# Patient Record
Sex: FEMALE | Race: WHITE | URBAN - METROPOLITAN AREA
[De-identification: names, ages, dates, MRNs, and addresses within clinical notes are randomized per-mention and may not be internally consistent; named-entity substitution may affect disease eponyms.]

---

## 2017-11-14 ENCOUNTER — APPOINTMENT (RX ONLY)
Dept: URBAN - METROPOLITAN AREA TELEMEDICINE 2 | Facility: TELEMEDICINE | Age: 52
Setting detail: DERMATOLOGY
End: 2017-11-14

## 2017-11-14 DIAGNOSIS — Z41.9 ENCOUNTER FOR PROCEDURE FOR PURPOSES OTHER THAN REMEDYING HEALTH STATE, UNSPECIFIED: ICD-10-CM

## 2017-11-14 PROCEDURE — ? MEDICAL CONSULTATION: BROWN SPOTS

## 2017-11-14 ASSESSMENT — LOCATION DETAILED DESCRIPTION DERM: LOCATION DETAILED: LEFT CENTRAL MALAR CHEEK

## 2017-11-14 ASSESSMENT — LOCATION ZONE DERM: LOCATION ZONE: FACE

## 2017-11-14 ASSESSMENT — LOCATION SIMPLE DESCRIPTION DERM: LOCATION SIMPLE: LEFT CHEEK

## 2017-12-15 ENCOUNTER — APPOINTMENT (RX ONLY)
Dept: URBAN - METROPOLITAN AREA TELEMEDICINE 2 | Facility: TELEMEDICINE | Age: 52
Setting detail: DERMATOLOGY
End: 2017-12-15

## 2017-12-15 ENCOUNTER — RX ONLY (OUTPATIENT)
Age: 52
Setting detail: RX ONLY
End: 2017-12-15

## 2017-12-15 DIAGNOSIS — Z41.9 ENCOUNTER FOR PROCEDURE FOR PURPOSES OTHER THAN REMEDYING HEALTH STATE, UNSPECIFIED: ICD-10-CM

## 2017-12-15 PROCEDURE — ? SCITON BBL

## 2017-12-15 RX ORDER — VALACYCLOVIR HYDROCHLORIDE 500 MG/1
1 TABLET, FILM COATED ORAL TWICE A DAY
Qty: 30 | Refills: 1 | Status: ERX | COMMUNITY
Start: 2017-12-15

## 2017-12-15 ASSESSMENT — LOCATION DETAILED DESCRIPTION DERM: LOCATION DETAILED: LEFT MEDIAL MALAR CHEEK

## 2017-12-15 ASSESSMENT — LOCATION ZONE DERM: LOCATION ZONE: FACE

## 2017-12-15 ASSESSMENT — LOCATION SIMPLE DESCRIPTION DERM: LOCATION SIMPLE: LEFT CHEEK

## 2017-12-15 NOTE — HPI: FACE (AGING FACE)
How Severe Is It?: moderate
Is This A New Presentation, Or A Follow-Up?: Aging Face
Additional History: No autoimmune, hepatitis, diabetes, HIV, pregnant. Has HSV.

## 2017-12-15 NOTE — PROCEDURE: SCITON BBL
Spot Size: Finesse Adapter Size: 15 x 15 mm square
Cooling ?: Yes
Repetition Rate (Hz): 10
Cooling (In C): 20
Post-Care Instructions: I reviewed with the patient in detail post-care instructions. Patient should stay away from the sun and wear sun protection until treated areas are fully healed.\\n*
Spot Size: Finesse Adapter Size: 15 x 45 mm (No Finesse Adapter)
Treated Area: medium area
Anesthesia Volume In Cc: 0
Spot Size: Finesse Adapter Size: 11 mm round
Pulse Duration Units: milliseconds
Fluence (J/Cm2): 8
Treatment Number: 1
Treated Area: small area
Cooling (In C): 18
Post Procedure Text: The patient tolerated the procedure well. Post care was reviewd with the patient.\\n* purchased 1 full bbl, 2 bbl brown, 2 TCA spot.
Detail Level: Zone
Fluence (J/Cm2): 13
Consent: Written consent obtained, risks reviewed including but not limited to crusting, scabbing, blistering, scarring, darker or lighter pigmentary change, bruising, and/or incomplete response.
Preprocedure Text: The treatment areas were thoroughly cleaned. Any exposed at risk hair that was not to be treated was covered in white paper tape. Clear ultrasound gel was applied to area being treated.
Pulse Duration: 15
Pulse Duration: 25

## 2018-01-12 ENCOUNTER — APPOINTMENT (RX ONLY)
Dept: URBAN - METROPOLITAN AREA TELEMEDICINE 2 | Facility: TELEMEDICINE | Age: 53
Setting detail: DERMATOLOGY
End: 2018-01-12

## 2018-01-12 DIAGNOSIS — Z41.9 ENCOUNTER FOR PROCEDURE FOR PURPOSES OTHER THAN REMEDYING HEALTH STATE, UNSPECIFIED: ICD-10-CM

## 2018-01-12 PROCEDURE — ? SCITON BBL

## 2018-01-12 PROCEDURE — ? CHEMICAL PEEL

## 2018-01-12 ASSESSMENT — LOCATION DETAILED DESCRIPTION DERM
LOCATION DETAILED: LEFT INFERIOR CENTRAL MALAR CHEEK
LOCATION DETAILED: LEFT CENTRAL MALAR CHEEK

## 2018-01-12 ASSESSMENT — LOCATION SIMPLE DESCRIPTION DERM: LOCATION SIMPLE: LEFT CHEEK

## 2018-01-12 ASSESSMENT — LOCATION ZONE DERM: LOCATION ZONE: FACE

## 2018-01-12 NOTE — PROCEDURE: SCITON BBL
Consent: Written consent obtained, risks reviewed including but not limited to crusting, scabbing, blistering, scarring, darker or lighter pigmentary change, bruising, and/or incomplete response.
Pulse Duration Units: milliseconds
Repetition Rate (Hz): 13
Passes: 1
Spot Size: Finesse Adapter Size: 15 x 45 mm (No Finesse Adapter)
Treatment Number: 2
Post Procedure Text: The patient tolerated the procedure well.
Spot Size: Finesse Adapter Size: 15 x 15 mm square
Cooling (In C): 21
Pulse Duration: 15
Detail Level: Zone
Anesthesia Volume In Cc: 0
Fluence (J/Cm2): 25
Fluence (J/Cm2): 10
Pulse Duration: 20
Post-Care Instructions: I reviewed with the patient in detail post-care instructions. Patient should stay away from the sun and wear sun protection until treated areas are fully healed
Cooling ?: Yes
Treated Area: medium area
Preprocedure Text: The treatment areas were thoroughly cleaned. Any exposed at risk hair that was not to be treated was covered in white paper tape. Clear ultrasound gel was applied to the treatment area. The area was treated with no immediate stacking of pulses.

## 2018-01-12 NOTE — PROCEDURE: CHEMICAL PEEL
Comments: Spot treat TCA left cheek
Chemical Peel: TCA 15%
Post-Care Instructions: I reviewed with the patient in detail post-care instructions. Patient should avoid sun exposure and wear sun protection.
Prep: Faced washed and prepped in a typical fashion.
Consent: Prior to the procedure, written consent was obtained and risks were reviewed, including but not limited to: redness, peeling, blistering, pigmentary change, scarring, infection, and pain.
Detail Level: Zone
Post Peel Care: Follow post peel care as instructed
Number Of Layers: 1

## 2018-02-23 ENCOUNTER — APPOINTMENT (RX ONLY)
Dept: URBAN - METROPOLITAN AREA TELEMEDICINE 2 | Facility: TELEMEDICINE | Age: 53
Setting detail: DERMATOLOGY
End: 2018-02-23

## 2018-02-23 DIAGNOSIS — Z41.9 ENCOUNTER FOR PROCEDURE FOR PURPOSES OTHER THAN REMEDYING HEALTH STATE, UNSPECIFIED: ICD-10-CM

## 2018-02-23 PROCEDURE — ? CHEMICAL PEEL

## 2018-02-23 PROCEDURE — ? OTHER (COSMETIC)

## 2018-02-23 PROCEDURE — ? SCITON BBL

## 2018-02-23 ASSESSMENT — LOCATION DETAILED DESCRIPTION DERM
LOCATION DETAILED: LEFT INFERIOR CENTRAL MALAR CHEEK
LOCATION DETAILED: LEFT CENTRAL MALAR CHEEK
LOCATION DETAILED: RIGHT INFERIOR MEDIAL MALAR CHEEK

## 2018-02-23 ASSESSMENT — LOCATION SIMPLE DESCRIPTION DERM
LOCATION SIMPLE: RIGHT CHEEK
LOCATION SIMPLE: LEFT CHEEK

## 2018-02-23 ASSESSMENT — LOCATION ZONE DERM: LOCATION ZONE: FACE

## 2018-02-23 NOTE — PROCEDURE: OTHER (COSMETIC)
Other (Free Text): Suggested patient schedule in 1 month for another treatment.\\nPatient will owe $290
Detail Level: Zone

## 2018-02-23 NOTE — PROCEDURE: CHEMICAL PEEL
Prep: Faced washed and prepped in a typical fashion.
Post-Care Instructions: I reviewed with the patient in detail post-care instructions. Patient should avoid sun exposure and wear sun protection.
Post Peel Care: Follow post peel care as instructed
Number Of Layers: 3
Chemical Peel: TCA 15%
Detail Level: Zone
Consent: Prior to the procedure, written consent was obtained and risks were reviewed, including but not limited to: redness, peeling, blistering, pigmentary change, scarring, infection, and pain.
Comments: Spot treat SK on L side of face

## 2018-02-23 NOTE — PROCEDURE: SCITON BBL
Repetition Rate (Hz): 13
Repetition Rate (Hz): 12
Pulse Duration: 20
Cooling ?: Yes
Repetition Rate (Hz): 10
Spot Size: Finesse Adapter Size: 15 x 15 mm square
Cooling (In C): 15
Pulse Duration Units: milliseconds
Passes: 1
Fluence (J/Cm2): 25
Fluence (J/Cm2): 14
Spot Size: Finesse Adapter Size: 15 x 45 mm (No Finesse Adapter)
Consent: Written consent obtained, risks reviewed including but not limited to crusting, scabbing, blistering, scarring, darker or lighter pigmentary change, bruising, and/or incomplete response.
Preprocedure Text: The treatment areas were thoroughly cleaned. Any exposed at risk hair that was not to be treated was covered in white paper tape. Clear ultrasound gel was applied to the treatment area. The area was treated with no immediate stacking of pulses.
Treated Area: medium area
Treatment Number: 3
Anesthesia Volume In Cc: 0
Detail Level: Zone
Post-Care Instructions: I reviewed with the patient in detail post-care instructions. Patient should stay away from the sun and wear sun protection until treated areas are fully healed
Cooling (In C): 22
Post Procedure Text: The patient tolerated the procedure well.

## 2018-09-01 NOTE — HPI: FACE (AGING FACE)
How Severe Is It?: moderate
Is This A New Presentation, Or A Follow-Up?: Aging Face
Additional History: No autoimmune, hepatitis, diabetes, pregnant, HIV. Has HSV.
Ambulatory

## 2019-02-04 ENCOUNTER — APPOINTMENT (RX ONLY)
Dept: URBAN - METROPOLITAN AREA TELEMEDICINE 2 | Facility: TELEMEDICINE | Age: 54
Setting detail: DERMATOLOGY
End: 2019-02-04

## 2019-02-04 DIAGNOSIS — Z41.9 ENCOUNTER FOR PROCEDURE FOR PURPOSES OTHER THAN REMEDYING HEALTH STATE, UNSPECIFIED: ICD-10-CM

## 2019-02-04 DIAGNOSIS — L82.1 OTHER SEBORRHEIC KERATOSIS: ICD-10-CM

## 2019-02-04 PROCEDURE — ? OTHER

## 2019-02-04 PROCEDURE — ? COUNSELING

## 2019-02-04 PROCEDURE — ? COSMETIC CONSULTATION: FILLERS

## 2019-02-04 PROCEDURE — ? FILLERS

## 2019-02-04 PROCEDURE — ? PATIENT SPECIFIC COUNSELING

## 2019-02-04 PROCEDURE — ? BENIGN DESTRUCTION COSMETIC

## 2019-02-04 ASSESSMENT — LOCATION SIMPLE DESCRIPTION DERM
LOCATION SIMPLE: LEFT CHEEK
LOCATION SIMPLE: LEFT EYEBROW

## 2019-02-04 ASSESSMENT — LOCATION ZONE DERM: LOCATION ZONE: FACE

## 2019-02-04 ASSESSMENT — LOCATION DETAILED DESCRIPTION DERM
LOCATION DETAILED: LEFT LATERAL EYEBROW
LOCATION DETAILED: LEFT SUPERIOR CENTRAL MALAR CHEEK

## 2019-02-04 NOTE — PROCEDURE: BENIGN DESTRUCTION COSMETIC
Price (Use Numbers Only, No Special Characters Or $): 50
Anesthesia Volume In Cc: 0.5
Consent: The patient's consent was obtained including but not limited to risks of crusting, scabbing, blistering, scarring, darker or lighter pigmentary change, recurrence, incomplete removal and infection.
Post-Care Instructions: I reviewed with the patient in detail post-care instructions. Patient is to wear sunprotection, and avoid picking at any of the treated lesions. Pt may apply Vaseline to crusted or scabbing areas.
Detail Level: Simple

## 2019-02-04 NOTE — PROCEDURE: FILLERS
Additional Area 1 Volume In Cc: 0
Expiration Date (Month Year): 08/04/2019
Expiration Date (Month Year): 7-
Include Cannula Information In Note?: No
Anesthesia Type: 1% lidocaine with epinephrine
Anesthesia Volume In Cc: 0.5
Additional Anesthesia Volume In Cc: 6
Consent: Written consent obtained. Risks include but not limited to bruising, beading, irregular texture, ulceration, infection, allergic reaction, scar formation, incomplete augmentation, temporary nature, procedural pain.
Topical Anesthesia?: BLT cream (benzocaine 20%, lidocaine 6%, tetracaine 4%)
Post-Care Instructions: Patient instructed to apply ice to reduce swelling.
Filler: Juvederm Volbella XC
Detail Level: Detailed
Price (Use Numbers Only, No Special Characters Or $): 450
Lot #: [id number]
Lot #: [id number]
Map Statment: See Attach Map for Complete Details
Reconstitution Date: 02/04/2019

## 2019-03-01 ENCOUNTER — APPOINTMENT (RX ONLY)
Dept: URBAN - METROPOLITAN AREA TELEMEDICINE 2 | Facility: TELEMEDICINE | Age: 54
Setting detail: DERMATOLOGY
End: 2019-03-01

## 2019-03-01 DIAGNOSIS — L82.1 OTHER SEBORRHEIC KERATOSIS: ICD-10-CM

## 2019-03-01 DIAGNOSIS — Z41.9 ENCOUNTER FOR PROCEDURE FOR PURPOSES OTHER THAN REMEDYING HEALTH STATE, UNSPECIFIED: ICD-10-CM

## 2019-03-01 PROCEDURE — ? COSMETIC FOLLOW-UP

## 2019-03-01 PROCEDURE — ? COSMETIC CONSULTATION: FILLERS

## 2019-03-01 PROCEDURE — ? OTHER

## 2019-03-01 ASSESSMENT — LOCATION SIMPLE DESCRIPTION DERM: LOCATION SIMPLE: LEFT CHEEK

## 2019-03-01 ASSESSMENT — LOCATION DETAILED DESCRIPTION DERM: LOCATION DETAILED: LEFT CENTRAL MALAR CHEEK

## 2019-03-01 ASSESSMENT — LOCATION ZONE DERM: LOCATION ZONE: FACE

## 2019-03-01 NOTE — PROCEDURE: COSMETIC FOLLOW-UP
Patient Satisfaction: Pleased
Global Improvement: Good
Side Effects Or Complications: None
Treatment (Optional): Filler Injection
Detail Level: Zone

## 2019-03-01 NOTE — PROCEDURE: OTHER
Other (Free Text): no charge touch up Ln2 treatment for remaining lesion on left cheek previously treated at last visit.
Note Text (......Xxx Chief Complaint.): This diagnosis correlates with the
Detail Level: Simple